# Patient Record
Sex: FEMALE | Race: WHITE | NOT HISPANIC OR LATINO | Employment: FULL TIME | URBAN - METROPOLITAN AREA
[De-identification: names, ages, dates, MRNs, and addresses within clinical notes are randomized per-mention and may not be internally consistent; named-entity substitution may affect disease eponyms.]

---

## 2023-05-25 ENCOUNTER — OFFICE VISIT (OUTPATIENT)
Dept: PRIMARY CARE | Facility: CLINIC | Age: 23
End: 2023-05-25
Payer: COMMERCIAL

## 2023-05-25 VITALS
WEIGHT: 124 LBS | SYSTOLIC BLOOD PRESSURE: 110 MMHG | DIASTOLIC BLOOD PRESSURE: 64 MMHG | TEMPERATURE: 99 F | BODY MASS INDEX: 23.43 KG/M2

## 2023-05-25 DIAGNOSIS — R53.83 FATIGUE, UNSPECIFIED TYPE: ICD-10-CM

## 2023-05-25 DIAGNOSIS — R41.840 ATTENTION AND CONCENTRATION DEFICIT: Primary | ICD-10-CM

## 2023-05-25 PROCEDURE — 1036F TOBACCO NON-USER: CPT | Performed by: FAMILY MEDICINE

## 2023-05-25 PROCEDURE — 99213 OFFICE O/P EST LOW 20 MIN: CPT | Performed by: FAMILY MEDICINE

## 2023-05-25 RX ORDER — BUPROPION HYDROCHLORIDE 200 MG/1
200 TABLET, EXTENDED RELEASE ORAL
COMMUNITY
Start: 2023-05-18

## 2023-05-25 RX ORDER — SERTRALINE HYDROCHLORIDE 25 MG/1
TABLET, FILM COATED ORAL
COMMUNITY
Start: 2021-11-29

## 2023-05-25 RX ORDER — LAMOTRIGINE 100 MG/1
1 TABLET ORAL DAILY
COMMUNITY
Start: 2023-05-18

## 2023-05-25 NOTE — PROGRESS NOTES
Subjective   Patient ID: Criss Garces is a 22 y.o. female who presents for Medication Problem (EP. Here to discuss medication).  HPI  Has been seeing S[facility] at [facility]  Has tried different medications- takes Zoloft 50 mg and Lamictal 100 mg and Wellbutrin 150 mg- depression/anxiety adequately controlled     She reports exhausted all the time, can't focus  Goes to bed at 11 pm, wakes up at 8 am  She snores but no apneic episodes per boyfriend    Doesn't finish tasks     Objective   Visit Vitals  /64   Temp 37.2 °C (99 °F) (Oral)        Assessment/Plan   Diagnoses and all orders for this visit:  Attention and concentration deficit  Fatigue, unspecified type    Have asked her to get ADD evaluation - perhaps this can be done by Siena Silverio, and then I will review evaluation     Anahy Noe MD  Family Medicine   [facility]

## 2023-05-30 ENCOUNTER — TELEPHONE (OUTPATIENT)
Dept: PRIMARY CARE | Facility: CLINIC | Age: 23
End: 2023-05-30
Payer: COMMERCIAL

## 2023-05-30 NOTE — TELEPHONE ENCOUNTER
The pt called stating that Dr. Noe gave her people to contact to get an eval for add and she has called everyone and either no one is doing it or she cant get a hold of anyone. She would like a call back to know what her next steps are.

## 2023-05-31 NOTE — TELEPHONE ENCOUNTER
ML on  and provided with [facility] [phone] ([address]- [facility])

## 2023-06-06 ENCOUNTER — TELEPHONE (OUTPATIENT)
Dept: PRIMARY CARE | Facility: CLINIC | Age: 23
End: 2023-06-06
Payer: COMMERCIAL

## 2023-06-06 DIAGNOSIS — F90.0 ATTENTION DEFICIT HYPERACTIVITY DISORDER (ADHD), PREDOMINANTLY INATTENTIVE TYPE: Primary | ICD-10-CM

## 2023-06-06 NOTE — TELEPHONE ENCOUNTER
Pt called wanting to know if you had the chance to review the evaluation for her ADD.   She is starting a new job soon and would like to start a medication. Please advise

## 2023-06-08 ENCOUNTER — TELEPHONE (OUTPATIENT)
Dept: PRIMARY CARE | Facility: CLINIC | Age: 23
End: 2023-06-08
Payer: COMMERCIAL

## 2023-06-08 ENCOUNTER — PATIENT MESSAGE (OUTPATIENT)
Dept: PRIMARY CARE | Facility: CLINIC | Age: 23
End: 2023-06-08
Payer: COMMERCIAL

## 2023-06-08 DIAGNOSIS — F90.0 ATTENTION DEFICIT HYPERACTIVITY DISORDER (ADHD), PREDOMINANTLY INATTENTIVE TYPE: ICD-10-CM

## 2023-06-08 RX ORDER — DEXTROAMPHETAMINE SACCHARATE, AMPHETAMINE ASPARTATE MONOHYDRATE, DEXTROAMPHETAMINE SULFATE AND AMPHETAMINE SULFATE 2.5; 2.5; 2.5; 2.5 MG/1; MG/1; MG/1; MG/1
CAPSULE, EXTENDED RELEASE ORAL
Qty: 30 CAPSULE | Refills: 0 | Status: SHIPPED | OUTPATIENT
Start: 2023-06-08 | End: 2023-06-08 | Stop reason: SDUPTHER

## 2023-06-08 RX ORDER — DEXTROAMPHETAMINE SACCHARATE, AMPHETAMINE ASPARTATE MONOHYDRATE, DEXTROAMPHETAMINE SULFATE AND AMPHETAMINE SULFATE 2.5; 2.5; 2.5; 2.5 MG/1; MG/1; MG/1; MG/1
CAPSULE, EXTENDED RELEASE ORAL
Qty: 30 CAPSULE | Refills: 0 | Status: SHIPPED | OUTPATIENT
Start: 2023-06-08

## 2023-06-08 NOTE — TELEPHONE ENCOUNTER
----- Message from Anahy Noe MD sent at 6/7/2023 11:47 PM EDT -----  Regarding: FW: Diagnosis from Siena Manleyugh   Contact: [phone]  See other message  ----- Message -----  From: Jerri Travis LPN  Sent: 6/5/2023  10:38 AM EDT  To: Anahy Noe MD  Subject: FW: Diagnosis from Siena Jocy              ----- Message -----  From: Criss Garces  Sent: 6/5/2023  10:06 AM EDT  To: #  Subject: Diagnosis from Siena Jocy                Good Morning!    Just wondering if my doctor sent over my information and diagnosis of ADD/ADHD. She was able to pull up my paperwork from when she first began seeing me and give me a diagnosis, and I was just curious what I needed to do from here. Thanks!

## 2023-06-08 NOTE — TELEPHONE ENCOUNTER
Spoke to pt and asks to have ADHD form faxed to office at [phone] and would like RX sent to [facility] today, if possible.  jacques

## 2023-06-08 NOTE — TELEPHONE ENCOUNTER
I did review what I received from Bob and can start her on a medication for ADHD. I will begin her on Adderall XR. I would like her to update a ADHD questionaire form- does she want to pick form up or have it mailed to her home (should have had her do this at recent appointment) and then if she could return it to me ASAP. Where does she want Rx called in to? She will need follow up with me in 2-3 weeks.